# Patient Record
Sex: FEMALE | URBAN - METROPOLITAN AREA
[De-identification: names, ages, dates, MRNs, and addresses within clinical notes are randomized per-mention and may not be internally consistent; named-entity substitution may affect disease eponyms.]

---

## 2021-02-04 ENCOUNTER — OFFICE VISIT (OUTPATIENT)
Dept: URBAN - METROPOLITAN AREA CLINIC 75 | Facility: CLINIC | Age: 41
End: 2021-02-04
Payer: COMMERCIAL

## 2021-02-04 DIAGNOSIS — H52.223 REGULAR ASTIGMATISM, BILATERAL: ICD-10-CM

## 2021-02-04 DIAGNOSIS — H04.123 DRY EYE SYNDROME OF BILATERAL LACRIMAL GLANDS: Primary | ICD-10-CM

## 2021-02-04 PROCEDURE — 99204 OFFICE O/P NEW MOD 45 MIN: CPT | Performed by: OPTOMETRIST

## 2021-02-04 ASSESSMENT — INTRAOCULAR PRESSURE
OD: 13
OS: 13

## 2021-02-04 ASSESSMENT — VISUAL ACUITY
OD: 20/50
OS: 20/50

## 2021-02-04 NOTE — IMPRESSION/PLAN
Impression: Dry eye syndrome of bilateral lacrimal glands: H04.123. Plan: Discussed dry eye syndrome requires lubrication of the eye with artificial tears and nighttime ointments or gels. In addition, topical and oral antiinflammatory medications are usually necessary to treat the associated ocular irritation. Use your eyedrops and lubricating ointments on a regular basis, and don't wait until your eyes get dry or start burning before using them. Pt to contact office if dry eye syndrome does not improve or worsens despite treatment or if vision gets blurry.

## 2021-02-18 ENCOUNTER — OFFICE VISIT (OUTPATIENT)
Dept: URBAN - METROPOLITAN AREA CLINIC 75 | Facility: CLINIC | Age: 41
End: 2021-02-18

## 2021-02-18 DIAGNOSIS — H40.013 OPEN ANGLE WITH BORDERLINE FINDINGS, LOW RISK, BILATERAL: Primary | ICD-10-CM

## 2021-02-18 PROCEDURE — 99213 OFFICE O/P EST LOW 20 MIN: CPT | Performed by: OPTOMETRIST

## 2021-02-25 NOTE — IMPRESSION/PLAN
Impression: Open angle with borderline findings, low risk, bilateral: H40.013. 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 Baseline ERG performed today.   Will continue to observe

## 2024-05-07 ENCOUNTER — OFFICE VISIT (OUTPATIENT)
Dept: URBAN - METROPOLITAN AREA CLINIC 75 | Facility: CLINIC | Age: 44
End: 2024-05-07
Payer: COMMERCIAL

## 2024-05-07 DIAGNOSIS — Q05.9 SPINA BIFIDA: ICD-10-CM

## 2024-05-07 DIAGNOSIS — H52.223 REGULAR ASTIGMATISM, BILATERAL: ICD-10-CM

## 2024-05-07 DIAGNOSIS — H55.02 LATENT NYSTAGMUS: Primary | ICD-10-CM

## 2024-05-07 PROCEDURE — 99204 OFFICE O/P NEW MOD 45 MIN: CPT | Performed by: OPTOMETRIST

## 2024-05-07 ASSESSMENT — INTRAOCULAR PRESSURE
OS: 16
OD: 14

## 2025-05-12 ENCOUNTER — OFFICE VISIT (OUTPATIENT)
Dept: URBAN - METROPOLITAN AREA CLINIC 75 | Facility: CLINIC | Age: 45
End: 2025-05-12
Payer: COMMERCIAL

## 2025-05-12 DIAGNOSIS — H55.02 LATENT NYSTAGMUS: Primary | ICD-10-CM

## 2025-05-12 DIAGNOSIS — Q05.9 SPINA BIFIDA: ICD-10-CM

## 2025-05-12 DIAGNOSIS — D35.2 BENIGN TUMOR OF PITUITARY GLAND: ICD-10-CM

## 2025-05-12 PROCEDURE — 99214 OFFICE O/P EST MOD 30 MIN: CPT | Performed by: OPTOMETRIST

## 2025-05-12 ASSESSMENT — INTRAOCULAR PRESSURE
OS: 18
OD: 16

## 2025-07-02 ENCOUNTER — OFFICE VISIT (OUTPATIENT)
Dept: URBAN - METROPOLITAN AREA CLINIC 75 | Facility: CLINIC | Age: 45
End: 2025-07-02
Payer: COMMERCIAL

## 2025-07-02 DIAGNOSIS — H55.02 LATENT NYSTAGMUS: ICD-10-CM

## 2025-07-02 DIAGNOSIS — H52.223 REGULAR ASTIGMATISM, BILATERAL: ICD-10-CM

## 2025-07-02 DIAGNOSIS — D35.2 BENIGN NEOPLASM OF PITUITARY GLAND: Primary | ICD-10-CM

## 2025-07-02 PROCEDURE — 99213 OFFICE O/P EST LOW 20 MIN: CPT | Performed by: OPTOMETRIST

## 2025-07-02 PROCEDURE — 92083 EXTENDED VISUAL FIELD XM: CPT | Performed by: OPTOMETRIST

## 2025-07-02 ASSESSMENT — VISUAL ACUITY
OD: 20/20
OS: 20/20

## 2025-07-02 ASSESSMENT — INTRAOCULAR PRESSURE
OD: 14
OS: 15